# Patient Record
Sex: FEMALE | Race: ASIAN | URBAN - METROPOLITAN AREA
[De-identification: names, ages, dates, MRNs, and addresses within clinical notes are randomized per-mention and may not be internally consistent; named-entity substitution may affect disease eponyms.]

---

## 2024-03-23 ENCOUNTER — OFFICE VISIT (OUTPATIENT)
Age: 54
End: 2024-03-23

## 2024-03-23 VITALS
TEMPERATURE: 97.7 F | WEIGHT: 192 LBS | DIASTOLIC BLOOD PRESSURE: 85 MMHG | HEIGHT: 63 IN | HEART RATE: 86 BPM | SYSTOLIC BLOOD PRESSURE: 127 MMHG | BODY MASS INDEX: 34.02 KG/M2 | OXYGEN SATURATION: 95 %

## 2024-03-23 DIAGNOSIS — L25.9 CONTACT DERMATITIS, UNSPECIFIED CONTACT DERMATITIS TYPE, UNSPECIFIED TRIGGER: Primary | ICD-10-CM

## 2024-03-23 RX ORDER — METHYLPREDNISOLONE 4 MG/1
TABLET ORAL
Qty: 21 TABLET | Refills: 0 | Status: SHIPPED | OUTPATIENT
Start: 2024-03-23 | End: 2024-03-29

## 2024-03-23 RX ORDER — LOSARTAN POTASSIUM 50 MG/1
50 TABLET ORAL DAILY
COMMUNITY

## 2024-03-23 RX ORDER — UBIDECARENONE 75 MG
100 CAPSULE ORAL DAILY
Qty: 30 TABLET | Refills: 0 | Status: SHIPPED | OUTPATIENT
Start: 2024-03-23 | End: 2025-03-23

## 2024-03-23 NOTE — PROGRESS NOTES
Ruba Davidson (:  1970) is a 53 y.o. female,New patient, here for evaluation of the following chief complaint(s):  Rash (Pt has rash on left side of neck to back of neck and in right arm, also recently dyed hair; also in ears. /Pt also has stomach pain and diarrhea, starting today.)      ASSESSMENT/PLAN:  1. Contact dermatitis, unspecified contact dermatitis type, unspecified trigger  - methylPREDNISolone (MEDROL DOSEPACK) 4 MG tablet; Take by mouth.  Dispense: 21 tablet; Refill: 0  - vitamin B-12 (CYANOCOBALAMIN) 100 MCG tablet; Take 1 tablet by mouth daily  Dispense: 30 tablet; Refill: 0       Return if symptoms worsen or fail to improve.    SUBJECTIVE/OBJECTIVE:  Present to clinic with rash on neck start yesterday. Rash itchy on neck happened after used hair dye.      History provided by:  Patient  Rash        Vitals:    24 1202   BP: 127/85   Pulse: 86   Temp: 97.7 °F (36.5 °C)   TempSrc: Oral   SpO2: 95%   Weight: 87.1 kg (192 lb)   Height: 1.6 m (5' 3\")       Review of Systems   Skin:  Positive for rash.       Physical Exam  Constitutional:       Appearance: Normal appearance.   HENT:      Head: Normocephalic and atraumatic.      Nose: Nose normal.      Mouth/Throat:      Mouth: Mucous membranes are moist.   Eyes:      Pupils: Pupils are equal, round, and reactive to light.   Pulmonary:      Effort: Pulmonary effort is normal.      Breath sounds: Normal breath sounds.   Musculoskeletal:         General: Normal range of motion.      Cervical back: Normal range of motion and neck supple.   Skin:     Findings: Rash present.   Neurological:      Mental Status: She is alert.   Psychiatric:         Mood and Affect: Mood normal.           An electronic signature was used to authenticate this note.    --Alen Phan DO

## 2024-05-04 ENCOUNTER — OFFICE VISIT (OUTPATIENT)
Age: 54
End: 2024-05-04

## 2024-05-04 VITALS
DIASTOLIC BLOOD PRESSURE: 87 MMHG | WEIGHT: 206.8 LBS | SYSTOLIC BLOOD PRESSURE: 130 MMHG | TEMPERATURE: 98.1 F | HEART RATE: 90 BPM | OXYGEN SATURATION: 97 % | BODY MASS INDEX: 36.64 KG/M2 | HEIGHT: 63 IN

## 2024-05-04 DIAGNOSIS — I10 PRIMARY HYPERTENSION: ICD-10-CM

## 2024-05-04 DIAGNOSIS — H65.01 RIGHT ACUTE SEROUS OTITIS MEDIA, RECURRENCE NOT SPECIFIED: Primary | ICD-10-CM

## 2024-05-04 RX ORDER — LOSARTAN POTASSIUM 50 MG/1
50 TABLET ORAL DAILY
Qty: 30 TABLET | Refills: 0 | Status: SHIPPED | OUTPATIENT
Start: 2024-05-04

## 2024-05-04 RX ORDER — AMOXICILLIN AND CLAVULANATE POTASSIUM 875; 125 MG/1; MG/1
1 TABLET, FILM COATED ORAL 2 TIMES DAILY
Qty: 20 TABLET | Refills: 0 | Status: SHIPPED | OUTPATIENT
Start: 2024-05-04 | End: 2024-05-14

## 2024-05-04 RX ORDER — IBUPROFEN 600 MG/1
600 TABLET ORAL 3 TIMES DAILY PRN
Qty: 30 TABLET | Refills: 0 | Status: SHIPPED | OUTPATIENT
Start: 2024-05-04

## 2024-05-04 NOTE — PROGRESS NOTES
Ruba Davidson (:  1970) is a 54 y.o. female,Established patient, here for evaluation of the following chief complaint(s):  Otalgia (Patient c/o right ear pain x3 days)      ASSESSMENT/PLAN:  1. Right acute serous otitis media, recurrence not specified  - amoxicillin-clavulanate (AUGMENTIN) 875-125 MG per tablet; Take 1 tablet by mouth 2 times daily for 10 days  Dispense: 20 tablet; Refill: 0  - ibuprofen (ADVIL;MOTRIN) 600 MG tablet; Take 1 tablet by mouth 3 times daily as needed for Pain  Dispense: 30 tablet; Refill: 0    2. Primary hypertension  - losartan (COZAAR) 50 MG tablet; Take 1 tablet by mouth daily  Dispense: 30 tablet; Refill: 0       Return if symptoms worsen or fail to improve.    SUBJECTIVE/OBJECTIVE:  PRESENT TO CLINIC WITH RIGHT EAR PAIN FOR THREE DAYS. NO FEVER. NO INJURY OR DISCHARGE.      History provided by:  Patient  Otalgia         Vitals:    24 1316 24 1318   BP: (!) 143/84 130/87   Site: Right Upper Arm    Position: Sitting    Cuff Size: Large Adult    Pulse: 90    Temp: 98.1 °F (36.7 °C)    TempSrc: Oral    SpO2: 97%    Weight: 93.8 kg (206 lb 12.8 oz)    Height: 1.6 m (5' 3\")        Review of Systems   HENT:  Positive for ear pain.        Physical Exam  Constitutional:       Appearance: Normal appearance.   HENT:      Head: Normocephalic and atraumatic.      Right Ear: Ear canal and external ear normal. There is no impacted cerumen.      Left Ear: Tympanic membrane, ear canal and external ear normal. There is no impacted cerumen.      Ears:      Comments: RIGHT TYMPANIC MEMBRANE BULGING.     Nose: Nose normal.      Mouth/Throat:      Mouth: Mucous membranes are moist.   Eyes:      Pupils: Pupils are equal, round, and reactive to light.   Pulmonary:      Effort: Pulmonary effort is normal.      Breath sounds: Normal breath sounds.   Musculoskeletal:         General: Normal range of motion.      Cervical back: Normal range of motion and neck supple.   Neurological:

## 2024-05-27 ENCOUNTER — OFFICE VISIT (OUTPATIENT)
Age: 54
End: 2024-05-27

## 2024-05-27 VITALS
WEIGHT: 207 LBS | DIASTOLIC BLOOD PRESSURE: 85 MMHG | HEIGHT: 63 IN | HEART RATE: 93 BPM | BODY MASS INDEX: 36.68 KG/M2 | SYSTOLIC BLOOD PRESSURE: 122 MMHG | OXYGEN SATURATION: 98 % | TEMPERATURE: 98.2 F

## 2024-05-27 DIAGNOSIS — H65.04 RECURRENT ACUTE SEROUS OTITIS MEDIA OF RIGHT EAR: Primary | ICD-10-CM

## 2024-05-27 DIAGNOSIS — H92.11 OTORRHEA OF RIGHT EAR: ICD-10-CM

## 2024-05-27 DIAGNOSIS — J06.9 URI WITH COUGH AND CONGESTION: ICD-10-CM

## 2024-05-27 RX ORDER — AMOXICILLIN AND CLAVULANATE POTASSIUM 500; 125 MG/1; MG/1
TABLET, FILM COATED ORAL
COMMUNITY
Start: 2024-03-10 | End: 2024-05-27

## 2024-05-27 RX ORDER — AMOXICILLIN AND CLAVULANATE POTASSIUM 875; 125 MG/1; MG/1
1 TABLET, FILM COATED ORAL 2 TIMES DAILY
Qty: 14 TABLET | Refills: 0 | Status: SHIPPED | OUTPATIENT
Start: 2024-05-27 | End: 2024-06-03

## 2024-05-27 ASSESSMENT — ENCOUNTER SYMPTOMS
SHORTNESS OF BREATH: 0
COUGH: 1
SINUS PRESSURE: 1
SWOLLEN GLANDS: 0
HOARSE VOICE: 1
SINUS COMPLAINT: 1
SORE THROAT: 1

## 2024-05-27 NOTE — PROGRESS NOTES
Ruba Davidson (:  1970) is a 54 y.o. female,Established patient, here for evaluation of the following chief complaint(s):  Sinus Problem (Patient c/o sore throat, cough, body aches/chills and right ear pain x2 days. Patient took Amoxicillin she received from her son this AM and tylenol at 4pm./Hx of Sinus)      ASSESSMENT/PLAN:  Visit Diagnoses and Associated Orders       Recurrent acute serous otitis media of right ear    -  Primary    amoxicillin-clavulanate (AUGMENTIN) 875-125 MG per tablet [[id number]]           URI with cough and congestion        Pseudoephedrine-DM-GG 60- MG TABS [[id number]]           Otorrhea of right ear                    Right ear pain with otorrhea and history of chronic infections with perforation will supply with course of antibiotic to use if not improving.  Seems you also have upper respiratory infection starting yesterday.  Prescription for medication to help with congestion and cough.  Use as needed and prescribed.  Return if symptoms persist or change.      SUBJECTIVE/OBJECTIVE:      History provided by:  Patient   used: No    Sinus Problem  This is a new problem. The current episode started yesterday. The problem has been rapidly worsening since onset. Maximum temperature: unmeasured. The fever has been present for Less than 1 day. The pain is mild. Associated symptoms include chills, congestion, coughing, ear pain, headaches, a hoarse voice, sinus pressure and a sore throat. Pertinent negatives include no shortness of breath or swollen glands. Past treatments include acetaminophen and antibiotics. The treatment provided mild relief.   Otalgia   There is pain in the right ear. This is a chronic problem. The current episode started in the past 7 days. The problem has been gradually worsening. Associated symptoms include coughing, ear discharge, headaches and a sore throat. Her past medical history is significant for a chronic ear infection. There is no

## 2024-05-27 NOTE — PATIENT INSTRUCTIONS
Right ear pain with otorrhea and history of chronic infections with perforation will supply with course of antibiotic to use if not improving.  Seems you also have upper respiratory infection starting yesterday.  Prescription for medication to help with congestion and cough.  Use as needed and prescribed.  Return if symptoms persist or change.

## 2024-05-28 ENCOUNTER — OFFICE VISIT (OUTPATIENT)
Age: 54
End: 2024-05-28

## 2024-05-28 VITALS
SYSTOLIC BLOOD PRESSURE: 120 MMHG | WEIGHT: 207 LBS | OXYGEN SATURATION: 98 % | TEMPERATURE: 98 F | HEIGHT: 63 IN | RESPIRATION RATE: 17 BRPM | HEART RATE: 93 BPM | DIASTOLIC BLOOD PRESSURE: 80 MMHG | BODY MASS INDEX: 36.68 KG/M2

## 2024-05-28 DIAGNOSIS — J45.20 INTERMITTENT ASTHMA WITHOUT COMPLICATION, UNSPECIFIED ASTHMA SEVERITY: Primary | ICD-10-CM

## 2024-05-28 RX ORDER — ALBUTEROL SULFATE 90 UG/1
2 AEROSOL, METERED RESPIRATORY (INHALATION) 4 TIMES DAILY PRN
Qty: 18 G | Refills: 0 | Status: SHIPPED | OUTPATIENT
Start: 2024-05-28

## 2024-05-28 NOTE — PROGRESS NOTES
Ruba Davidson (:  1970) is a 54 y.o. female,Established patient, here for evaluation of the following chief complaint(s):  Congestion (Patient was here yesterday and today she is requesting a inhaler , patient has asthma )      ASSESSMENT/PLAN:  1. Intermittent asthma without complication, unspecified asthma severity  - albuterol sulfate HFA (VENTOLIN HFA) 108 (90 Base) MCG/ACT inhaler; Inhale 2 puffs into the lungs 4 times daily as needed for Wheezing  Dispense: 18 g; Refill: 0       Return if symptoms worsen or fail to improve.    SUBJECTIVE/OBJECTIVE:  PRESENT TO CLINIC WITH CONGESTION.HAS A HISTORY OF ASTHMA AND REQUESTING INHALER. WAS IN CLINIC YESTERDAY AND BEEN TREATED WITH ANTIBIOTICS.      History provided by:  Patient      Vitals:    24 1944   BP: 120/80   Site: Right Upper Arm   Position: Sitting   Cuff Size: Large Adult   Pulse: 93   Resp: 17   Temp: 98 °F (36.7 °C)   SpO2: 98%   Weight: 93.9 kg (207 lb)   Height: 1.6 m (5' 3\")       Review of Systems   HENT:  Positive for congestion.        Physical Exam  Constitutional:       Appearance: Normal appearance.   HENT:      Head: Normocephalic and atraumatic.      Nose: Nose normal.      Mouth/Throat:      Mouth: Mucous membranes are moist.   Eyes:      Pupils: Pupils are equal, round, and reactive to light.   Pulmonary:      Effort: Pulmonary effort is normal.      Breath sounds: Normal breath sounds.   Musculoskeletal:         General: Normal range of motion.      Cervical back: Normal range of motion and neck supple.   Neurological:      Mental Status: She is alert and oriented to person, place, and time.   Psychiatric:         Mood and Affect: Mood normal.           An electronic signature was used to authenticate this note.    --Alen Phan DO

## 2024-08-18 ENCOUNTER — OFFICE VISIT (OUTPATIENT)
Age: 54
End: 2024-08-18

## 2024-08-18 VITALS
BODY MASS INDEX: 35.61 KG/M2 | WEIGHT: 201 LBS | HEART RATE: 83 BPM | DIASTOLIC BLOOD PRESSURE: 85 MMHG | SYSTOLIC BLOOD PRESSURE: 131 MMHG | TEMPERATURE: 98.1 F | HEIGHT: 63 IN | OXYGEN SATURATION: 98 %

## 2024-08-18 DIAGNOSIS — J02.9 SORE THROAT: Primary | ICD-10-CM

## 2024-08-18 DIAGNOSIS — J02.9 PHARYNGITIS, UNSPECIFIED ETIOLOGY: ICD-10-CM

## 2024-08-18 DIAGNOSIS — J32.9 SINUSITIS, UNSPECIFIED CHRONICITY, UNSPECIFIED LOCATION: ICD-10-CM

## 2024-08-18 LAB — S PYO AG THROAT QL: NORMAL

## 2024-08-18 RX ORDER — METHYLPREDNISOLONE 4 MG/1
TABLET ORAL
Qty: 21 TABLET | Refills: 0 | Status: SHIPPED | OUTPATIENT
Start: 2024-08-18 | End: 2024-08-24

## 2024-10-07 ENCOUNTER — OFFICE VISIT (OUTPATIENT)
Age: 54
End: 2024-10-07

## 2024-10-07 VITALS
DIASTOLIC BLOOD PRESSURE: 81 MMHG | HEIGHT: 63 IN | BODY MASS INDEX: 36.14 KG/M2 | HEART RATE: 97 BPM | WEIGHT: 204 LBS | OXYGEN SATURATION: 98 % | TEMPERATURE: 98.5 F | SYSTOLIC BLOOD PRESSURE: 131 MMHG

## 2024-10-07 DIAGNOSIS — S61.412A LACERATION OF LEFT HAND WITHOUT FOREIGN BODY, INITIAL ENCOUNTER: ICD-10-CM

## 2024-10-07 DIAGNOSIS — G89.29 CHRONIC PAIN OF RIGHT KNEE: ICD-10-CM

## 2024-10-07 DIAGNOSIS — H72.91 TYMPANIC MEMBRANE RUPTURE, RIGHT: Primary | ICD-10-CM

## 2024-10-07 DIAGNOSIS — J30.2 SEASONAL ALLERGIES: ICD-10-CM

## 2024-10-07 DIAGNOSIS — M25.561 CHRONIC PAIN OF RIGHT KNEE: ICD-10-CM

## 2024-10-07 RX ORDER — FLUTICASONE PROPIONATE 50 MCG
2 SPRAY, SUSPENSION (ML) NASAL DAILY
Qty: 16 G | Refills: 0 | Status: SHIPPED | OUTPATIENT
Start: 2024-10-07

## 2024-10-07 RX ORDER — IBUPROFEN 600 MG/1
600 TABLET, FILM COATED ORAL 3 TIMES DAILY PRN
Qty: 90 TABLET | Refills: 0 | Status: SHIPPED | OUTPATIENT
Start: 2024-10-07

## 2024-10-07 RX ORDER — MUPIROCIN 20 MG/G
OINTMENT TOPICAL
Qty: 15 G | Refills: 0 | Status: SHIPPED | OUTPATIENT
Start: 2024-10-07 | End: 2024-10-14

## 2024-10-07 ASSESSMENT — ENCOUNTER SYMPTOMS
SHORTNESS OF BREATH: 0
COUGH: 0
RHINORRHEA: 0
SORE THROAT: 0
NAUSEA: 0
VOMITING: 0
DIARRHEA: 0

## 2024-10-07 NOTE — PROGRESS NOTES
Ruba Davidson (:  1970) is a 54 y.o. female,Established patient, here for evaluation of the following chief complaint(s):  Other (Small cut on left thumb with scissors, pain )      ASSESSMENT/PLAN:    ICD-10-CM    1. Tympanic membrane rupture, right  H72.91 amoxicillin-clavulanate (AUGMENTIN) 875-125 MG per tablet      2. Laceration of left hand without foreign body, initial encounter  S61.412A mupirocin (BACTROBAN) 2 % ointment      3. Chronic pain of right knee  M25.561 ibuprofen (ADVIL;MOTRIN) 600 MG tablet    G89.29       4. Seasonal allergies  J30.2 fluticasone (FLONASE) 50 MCG/ACT nasal spray        Patient presents to the clinic with multiple complaints. Patient is experiencing a chronic TM rupture. She thinks that she has gotten water in it recently and it has started to bother her again. This will be treated with Augmentin due to her dye allergy. She also has a healing laceration on her left hand that does not appear to be infected, but patient states that she has a \"weird sensation\" in there and would like something to put on it. Mupirocin ordered. She requests ibuprofen for her chronic knee pain and Fluticasone for her seasonal allergies because she is going out of town. These were sent. Encouraged her to return for worsening or persistent symptoms. She is understanding and agreeable to this plan.     Dx Disposition: AOM, AOE, effusion  Education and handout provided on diagnosis and management of symptoms.   AVS reviewed with patient. Follow up as needed in UC or with PCP for new or worsening symptoms.   Return if symptoms worsen or fail to improve.    SUBJECTIVE/OBJECTIVE:  Patient presents to the clinic with complaints of cut to her left thumb. This started 4-5 days ago. Pain is traveling to the tip of her left thumb. She is also complaining of right ear pain that started 4 days ago. She endorses feeling feverish last night. Denies any body aches. She has tried tylenol with some

## 2024-11-11 ENCOUNTER — OFFICE VISIT (OUTPATIENT)
Age: 54
End: 2024-11-11

## 2024-11-11 VITALS
RESPIRATION RATE: 16 BRPM | WEIGHT: 204 LBS | OXYGEN SATURATION: 96 % | BODY MASS INDEX: 36.14 KG/M2 | SYSTOLIC BLOOD PRESSURE: 114 MMHG | HEIGHT: 63 IN | DIASTOLIC BLOOD PRESSURE: 84 MMHG | HEART RATE: 92 BPM | TEMPERATURE: 98.3 F

## 2024-11-11 DIAGNOSIS — J45.21 MILD INTERMITTENT ASTHMA WITH EXACERBATION: Primary | ICD-10-CM

## 2024-11-11 DIAGNOSIS — H66.93 BILATERAL OTITIS MEDIA, UNSPECIFIED OTITIS MEDIA TYPE: ICD-10-CM

## 2024-11-11 DIAGNOSIS — J30.2 SEASONAL ALLERGIES: ICD-10-CM

## 2024-11-11 DIAGNOSIS — R21 RASH OF BOTH FEET: ICD-10-CM

## 2024-11-11 DIAGNOSIS — I10 PRIMARY HYPERTENSION: ICD-10-CM

## 2024-11-11 DIAGNOSIS — R05.1 ACUTE COUGH: ICD-10-CM

## 2024-11-11 RX ORDER — ALBUTEROL SULFATE 90 UG/1
2 INHALANT RESPIRATORY (INHALATION) EVERY 6 HOURS PRN
Qty: 54 G | Refills: 1 | Status: SHIPPED | OUTPATIENT
Start: 2024-11-11

## 2024-11-11 RX ORDER — LOSARTAN POTASSIUM 50 MG/1
50 TABLET ORAL DAILY
Qty: 90 TABLET | Refills: 1 | Status: SHIPPED | OUTPATIENT
Start: 2024-11-11

## 2024-11-11 RX ORDER — OFLOXACIN 3 MG/ML
10 SOLUTION AURICULAR (OTIC) DAILY
Qty: 10 ML | Refills: 0 | Status: SHIPPED | OUTPATIENT
Start: 2024-11-11 | End: 2024-11-16

## 2024-11-11 RX ORDER — FLUTICASONE PROPIONATE 50 MCG
2 SPRAY, SUSPENSION (ML) NASAL DAILY
Qty: 16 G | Refills: 0 | Status: SHIPPED | OUTPATIENT
Start: 2024-11-11

## 2024-11-11 RX ORDER — HYDROCORTISONE 25 MG/G
CREAM TOPICAL
Qty: 20 G | Refills: 1 | Status: SHIPPED | OUTPATIENT
Start: 2024-11-11

## 2024-11-11 RX ORDER — BENZONATATE 200 MG/1
200 CAPSULE ORAL 3 TIMES DAILY PRN
Qty: 30 CAPSULE | Refills: 0 | Status: SHIPPED | OUTPATIENT
Start: 2024-11-11 | End: 2024-11-21

## 2024-11-11 RX ORDER — OFLOXACIN 3 MG/ML
10 SOLUTION AURICULAR (OTIC) 2 TIMES DAILY
Qty: 10 ML | Refills: 0 | Status: SHIPPED | OUTPATIENT
Start: 2024-11-11 | End: 2024-11-11

## 2024-11-11 RX ORDER — PREDNISONE 20 MG/1
20 TABLET ORAL DAILY
Qty: 5 TABLET | Refills: 0 | Status: SHIPPED | OUTPATIENT
Start: 2024-11-11 | End: 2024-11-16

## 2024-11-11 ASSESSMENT — ENCOUNTER SYMPTOMS
SHORTNESS OF BREATH: 0
RHINORRHEA: 1
COUGH: 1
WHEEZING: 0

## 2024-11-11 NOTE — PATIENT INSTRUCTIONS
Take antibiotics until completed even if feeling better to prevent re-infection.   Use ear drops to help promote healing for the perforation (hole in ear drum)  Take prednisone daily in AM for 5 days which will help with inflammation.  Take cough medication as needed.  You should use your albuterol inhaler every 4-6 hours as needed for cough, shortness of breath, wheezing to help open your lungs.  Increase fluids to help thin mucus, especially electrolyte-infused fluids to help rehydrate your body as respiratory infections can dehydrate you.   Can alternate tylenol and ibuprofen for fever/pain.   Can use nasal saline and/or flonase to help with nasal congestion.    You can use antihistamines like claritin (loratadine), zyrtec (cetirizine), allegra (fexofenadine) daily to help with any allergy symptoms like runny nose, stuffy nose, sneezing   Can use hydrocortisone cream twice a day to feet as needed.

## 2024-11-11 NOTE — PROGRESS NOTES
place, and time.            Pt wants all prescriptions printed and signed      An electronic signature was used to authenticate this note.    --Laila Mar, CORTES - CNP

## 2025-01-11 ENCOUNTER — OFFICE VISIT (OUTPATIENT)
Age: 55
End: 2025-01-11

## 2025-01-11 VITALS
TEMPERATURE: 98.2 F | SYSTOLIC BLOOD PRESSURE: 127 MMHG | DIASTOLIC BLOOD PRESSURE: 83 MMHG | WEIGHT: 203.93 LBS | OXYGEN SATURATION: 98 % | HEART RATE: 81 BPM | BODY MASS INDEX: 36.12 KG/M2

## 2025-01-11 DIAGNOSIS — H66.91 RIGHT OTITIS MEDIA, UNSPECIFIED OTITIS MEDIA TYPE: Primary | ICD-10-CM

## 2025-01-11 DIAGNOSIS — G89.29 OTHER CHRONIC PAIN: ICD-10-CM

## 2025-01-11 DIAGNOSIS — I10 PRIMARY HYPERTENSION: ICD-10-CM

## 2025-01-11 DIAGNOSIS — Z76.0 MEDICATION REFILL: ICD-10-CM

## 2025-01-11 DIAGNOSIS — R05.1 ACUTE COUGH: ICD-10-CM

## 2025-01-11 RX ORDER — PREDNISONE 20 MG/1
20 TABLET ORAL DAILY
Qty: 5 TABLET | Refills: 0 | Status: SHIPPED | OUTPATIENT
Start: 2025-01-11 | End: 2025-01-16

## 2025-01-11 RX ORDER — ALBUTEROL SULFATE 90 UG/1
2 INHALANT RESPIRATORY (INHALATION) EVERY 6 HOURS PRN
Qty: 54 G | Refills: 2 | Status: SHIPPED | OUTPATIENT
Start: 2025-01-11

## 2025-01-11 RX ORDER — IBUPROFEN 800 MG/1
800 TABLET, FILM COATED ORAL EVERY 8 HOURS PRN
Qty: 180 TABLET | Refills: 1 | Status: SHIPPED | OUTPATIENT
Start: 2025-01-11

## 2025-01-11 RX ORDER — BENZONATATE 200 MG/1
200 CAPSULE ORAL 3 TIMES DAILY PRN
Qty: 30 CAPSULE | Refills: 0 | Status: SHIPPED | OUTPATIENT
Start: 2025-01-11 | End: 2025-01-21

## 2025-01-11 RX ORDER — LOSARTAN POTASSIUM 50 MG/1
50 TABLET ORAL DAILY
Qty: 30 TABLET | Refills: 1 | Status: SHIPPED | OUTPATIENT
Start: 2025-01-11

## 2025-01-11 ASSESSMENT — ENCOUNTER SYMPTOMS
RHINORRHEA: 0
SHORTNESS OF BREATH: 0
SORE THROAT: 0
CHEST TIGHTNESS: 0
COUGH: 1
TROUBLE SWALLOWING: 0
WHEEZING: 0

## 2025-02-08 ENCOUNTER — OFFICE VISIT (OUTPATIENT)
Age: 55
End: 2025-02-08

## 2025-02-08 VITALS
BODY MASS INDEX: 36.13 KG/M2 | SYSTOLIC BLOOD PRESSURE: 131 MMHG | OXYGEN SATURATION: 96 % | TEMPERATURE: 99.3 F | HEART RATE: 96 BPM | DIASTOLIC BLOOD PRESSURE: 86 MMHG | HEIGHT: 63 IN | WEIGHT: 203.93 LBS

## 2025-02-08 DIAGNOSIS — J01.00 ACUTE MAXILLARY SINUSITIS, RECURRENCE NOT SPECIFIED: Primary | ICD-10-CM

## 2025-02-08 RX ORDER — FLUTICASONE PROPIONATE 50 MCG
1 SPRAY, SUSPENSION (ML) NASAL DAILY
Qty: 16 G | Refills: 0 | Status: SHIPPED | OUTPATIENT
Start: 2025-02-08

## 2025-02-08 NOTE — PROGRESS NOTES
Ruba Davidson (:  1970) is a 54 y.o. female,Established patient, here for evaluation of the following chief complaint(s):  Ear Fullness and Sinusitis (Pt states ear fullness and right nasal drip, and dry cough for one week so far. )      ASSESSMENT/PLAN:  1. Acute maxillary sinusitis, recurrence not specified  - amoxicillin-clavulanate (AUGMENTIN) 875-125 MG per tablet; Take 1 tablet by mouth 2 times daily for 7 days  Dispense: 14 tablet; Refill: 0  - fluticasone (FLONASE) 50 MCG/ACT nasal spray; 1 spray by Each Nostril route daily  Dispense: 16 g; Refill: 0       Return if symptoms worsen or fail to improve.    SUBJECTIVE/OBJECTIVE:  HPI    Vitals:    25 1606   BP: 131/86   Site: Left Upper Arm   Position: Sitting   Cuff Size: Medium Adult   Pulse: 96   Temp: 99.3 °F (37.4 °C)   TempSrc: Temporal   SpO2: 96%   Weight: 92.5 kg (203 lb 14.8 oz)   Height: 1.6 m (5' 3\")       Review of Systems    Physical Exam      An electronic signature was used to authenticate this note.    --Alen Phan DO

## 2025-03-13 ENCOUNTER — OFFICE VISIT (OUTPATIENT)
Age: 55
End: 2025-03-13

## 2025-03-13 VITALS
SYSTOLIC BLOOD PRESSURE: 128 MMHG | WEIGHT: 190 LBS | HEIGHT: 63 IN | HEART RATE: 84 BPM | RESPIRATION RATE: 20 BRPM | BODY MASS INDEX: 33.66 KG/M2 | DIASTOLIC BLOOD PRESSURE: 85 MMHG | TEMPERATURE: 97.8 F | OXYGEN SATURATION: 96 %

## 2025-03-13 DIAGNOSIS — R05.1 ACUTE COUGH: ICD-10-CM

## 2025-03-13 DIAGNOSIS — I10 PRIMARY HYPERTENSION: ICD-10-CM

## 2025-03-13 DIAGNOSIS — H66.93 ACUTE BACTERIAL INFECTION OF BOTH MIDDLE EARS: Primary | ICD-10-CM

## 2025-03-13 RX ORDER — LOSARTAN POTASSIUM 50 MG/1
50 TABLET ORAL DAILY
Qty: 30 TABLET | Refills: 0 | Status: SHIPPED | OUTPATIENT
Start: 2025-03-13

## 2025-03-13 RX ORDER — FLUTICASONE PROPIONATE 50 MCG
2 SPRAY, SUSPENSION (ML) NASAL DAILY PRN
COMMUNITY
Start: 2025-03-13 | End: 2026-03-13

## 2025-03-13 RX ORDER — PREDNISONE 20 MG/1
40 TABLET ORAL
Qty: 10 TABLET | Refills: 0 | Status: SHIPPED | OUTPATIENT
Start: 2025-03-13 | End: 2025-03-13

## 2025-03-13 RX ORDER — PREDNISONE 20 MG/1
40 TABLET ORAL
Qty: 10 TABLET | Refills: 0 | Status: SHIPPED | OUTPATIENT
Start: 2025-03-13 | End: 2025-03-18

## 2025-03-13 RX ORDER — LOSARTAN POTASSIUM 50 MG/1
50 TABLET ORAL DAILY
Qty: 90 TABLET | Refills: 1 | Status: SHIPPED | OUTPATIENT
Start: 2025-03-13 | End: 2025-03-13

## 2025-03-13 RX ORDER — ALBUTEROL SULFATE 90 UG/1
2 INHALANT RESPIRATORY (INHALATION) EVERY 4 HOURS PRN
Qty: 6.7 G | Refills: 0 | Status: SHIPPED | OUTPATIENT
Start: 2025-03-13 | End: 2026-03-13

## 2025-03-13 RX ORDER — ALBUTEROL SULFATE 90 UG/1
2 INHALANT RESPIRATORY (INHALATION) EVERY 4 HOURS PRN
Qty: 6.7 G | Refills: 0 | Status: SHIPPED | OUTPATIENT
Start: 2025-03-13 | End: 2025-03-13

## 2025-03-13 ASSESSMENT — ENCOUNTER SYMPTOMS
SORE THROAT: 0
VOMITING: 0
ABDOMINAL PAIN: 0
RHINORRHEA: 0
COUGH: 1
DIARRHEA: 0

## 2025-03-13 NOTE — PATIENT INSTRUCTIONS
No point of care tests were completed  Take the antibiotics until completed, do not stop unless otherwise directed by a healthcare provider. I recommend daily yogurt or other probiotics while on antibiotics.  High Blood Pressure: For congestion and runny nose, I recommend Coricidin, Flonase (or other steroid nasal sprays), Zyrtec (or other antihistamines), Vicks vapor rub, and saline nasal spray. Do not use any products with Pseudoephedrine or Phenylephrine in them since you have high blood pressure.  For cough, I recommend Dextromethorphan (Robitussin, Delsym) and Jabier's Vapor Rub. Do not take other cough medications containing Dextromethorphan (DM) while on this medication. You can also use cough drops, honey, and throat lozenges. I recommend 1-2 teaspoons of honey every hour for relief of throat irritation and coughing fits.  For sore throat, you can use throat sprays (Chloraseptic, Cepacol), sipping on warm beverages (tea with honey), ice cream, popsicles, sore throat lozenges, and warm salt water gargles.  For fever, aches/pains, you can take ibuprofen (Advil, Motrin) and acetaminophen (Tylenol), if needed. Do not take this if you have been told to avoid these medications.  For ear pain, I recommend warm compresses over the symptomatic ear(s) for 10-15 minutes, or a hot shower, followed by 1-2 minutes of massaging the area behind your ears and down the jaw-line to help with the ear congestion/ear pressure  Increase your fluid intake and get lots of rest.  Warm teas, humidifiers, nasal lavages, and sleeping in an inclined position are also helpful options that can lessen symptoms.  Follow up with your PCP within 5 days or, if unable, you can return to the clinic if symptoms persist beyond 5 days or if symptoms worsen  If you develop shortness of breath, increased work of breathing, lightheadedness, or chest pain, contact 911, or follow up immediately with the nearest Emergency Department for further evaluation

## 2025-03-13 NOTE — PROGRESS NOTES
Ruba Davidson (: 1970) is a 54 y.o. female, here for evaluation of the following chief complaint(s): Ear Pain (Bilateral ear pain x 4 day)    Ruba Davidson is a: Established patient.   Last Urgent Care Visit: 2025   I have reviewed the patient's medications and basic medical history; see Medication Reconciliation.    ASSESSMENT/PLAN:    ICD-10-CM    1. Acute bacterial infection of both middle ears  H66.93       2. Primary hypertension  I10 losartan (COZAAR) 50 MG tablet     Amb Referral to Primary Care     DISCONTINUED: losartan (COZAAR) 50 MG tablet     DISCONTINUED: losartan (COZAAR) 50 MG tablet      3. Acute cough  R05.1           No point of care tests were completed  Symptoms include ear pain, wheezing, sinus pressure/pain, with exam findings of congestion, pharyngeal erythema, post nasal drip, middle ear effusion(s), perforated TM, suppurative middle ear effusion with bulging TM, there is concern  Bilateral Acute Otitis Media and viral URI with wheezing  Low concern for respiratory distress, community acquired pneumonia, PE mastoiditis, cellulitis, abscess, facial cellulitis, retained foreign bodies within the ear canal, otic eczema, and ear trauma,  Recommended:  Mucinex DM for cough with expectorant relief or plain DM if needed, advised to not use this during the night or with other DM products  Acetaminophen (Tylenol) and/or ibuprofen (Motrin, Advil) for aches/pains as needed, provided there are no contraindications  Dx of HTN: OTC Coricidin, Flonase, Zyrtec, and Saline nasal spray for congestion relief  Prescribed:   Prednisone for airway inflammation  Albuterol for bronchospasms and wheezing  Augmentin for otitis media  Recommended several other OTC and home remedy treatments for symptomatic relief as well  Strict ED follow up instructions provided  Discussed PCP follow up for persisting or worsening symptoms, or to return to the clinic if unable to obtain PCP follow up for worsening

## 2025-04-22 ENCOUNTER — OFFICE VISIT (OUTPATIENT)
Age: 55
End: 2025-04-22

## 2025-04-22 VITALS
HEART RATE: 72 BPM | DIASTOLIC BLOOD PRESSURE: 87 MMHG | BODY MASS INDEX: 33.66 KG/M2 | SYSTOLIC BLOOD PRESSURE: 122 MMHG | OXYGEN SATURATION: 97 % | WEIGHT: 190 LBS

## 2025-04-22 DIAGNOSIS — I10 HYPERTENSION, UNSPECIFIED TYPE: ICD-10-CM

## 2025-04-22 DIAGNOSIS — J01.00 ACUTE MAXILLARY SINUSITIS, RECURRENCE NOT SPECIFIED: Primary | ICD-10-CM

## 2025-04-22 DIAGNOSIS — R06.2 WHEEZING: ICD-10-CM

## 2025-04-22 RX ORDER — IBUPROFEN 600 MG/1
600 TABLET, FILM COATED ORAL 3 TIMES DAILY PRN
Qty: 30 TABLET | Refills: 0 | Status: SHIPPED | OUTPATIENT
Start: 2025-04-22 | End: 2025-05-02

## 2025-04-22 RX ORDER — METHYLPREDNISOLONE 4 MG/1
TABLET ORAL
Qty: 21 TABLET | Refills: 0 | Status: SHIPPED | OUTPATIENT
Start: 2025-04-22 | End: 2025-04-28

## 2025-04-22 RX ORDER — LOSARTAN POTASSIUM 50 MG/1
50 TABLET ORAL DAILY
Qty: 30 TABLET | Refills: 0 | Status: SHIPPED | OUTPATIENT
Start: 2025-04-22 | End: 2025-05-22

## 2025-04-22 RX ORDER — IBUPROFEN 600 MG/1
600 TABLET, FILM COATED ORAL 3 TIMES DAILY PRN
Qty: 30 TABLET | Refills: 0 | Status: SHIPPED | OUTPATIENT
Start: 2025-04-22

## 2025-04-22 NOTE — PROGRESS NOTES
Ruba Davidson (:  1970) is a 55 y.o. female,Established patient, here for evaluation of the following chief complaint(s):  Ear Pain (Pt states right ear pain just travelling back and forth from [place]. )      ASSESSMENT/PLAN:  1. Acute maxillary sinusitis, recurrence not specified  - amoxicillin-clavulanate (AUGMENTIN) 875-125 MG per tablet; Take 1 tablet by mouth 2 times daily for 10 days  Dispense: 20 tablet; Refill: 0    2. Hypertension, unspecified type  - losartan (COZAAR) 50 MG tablet; Take 1 tablet by mouth daily  Dispense: 30 tablet; Refill: 0       Return if symptoms worsen or fail to improve.    SUBJECTIVE/OBJECTIVE:  PRESENT TO CLINIC WITH RIGHT EAR PAIN. WORRY AS BEEN TRAVELLING BACK AND FORTH.NO EAR INJURY OR DISCHARGE      History provided by:  Patient  Ear Pain         Vitals:    25 1340   BP: 122/87   BP Site: Left Upper Arm   Patient Position: Sitting   BP Cuff Size: Medium Adult   Pulse: 72   SpO2: 97%   Weight: 86.2 kg (190 lb)       Review of Systems   HENT:  Positive for congestion and ear pain.        Physical Exam  Constitutional:       Appearance: Normal appearance.   HENT:      Head: Normocephalic and atraumatic.      Nose: Congestion present.      Mouth/Throat:      Mouth: Mucous membranes are moist.   Eyes:      Pupils: Pupils are equal, round, and reactive to light.   Pulmonary:      Effort: Pulmonary effort is normal.      Breath sounds: Normal breath sounds.   Musculoskeletal:         General: Normal range of motion.      Cervical back: Normal range of motion and neck supple.   Skin:     General: Skin is warm.   Neurological:      Mental Status: She is alert and oriented to person, place, and time.   Psychiatric:         Mood and Affect: Mood normal.         Behavior: Behavior normal.           An electronic signature was used to authenticate this note.    --Alen Phan DO

## 2025-06-16 ENCOUNTER — OFFICE VISIT (OUTPATIENT)
Age: 55
End: 2025-06-16

## 2025-06-16 VITALS
BODY MASS INDEX: 34.02 KG/M2 | TEMPERATURE: 98.3 F | HEART RATE: 95 BPM | SYSTOLIC BLOOD PRESSURE: 110 MMHG | OXYGEN SATURATION: 95 % | HEIGHT: 63 IN | DIASTOLIC BLOOD PRESSURE: 77 MMHG | WEIGHT: 192 LBS

## 2025-06-16 DIAGNOSIS — R06.2 WHEEZING: ICD-10-CM

## 2025-06-16 DIAGNOSIS — H65.191 ACUTE NON-SUPPURATIVE OTITIS MEDIA, RIGHT: ICD-10-CM

## 2025-06-16 DIAGNOSIS — J40 BRONCHITIS: Primary | ICD-10-CM

## 2025-06-16 RX ORDER — LOSARTAN POTASSIUM 50 MG/1
50 TABLET ORAL DAILY
Qty: 30 TABLET | Refills: 0 | Status: SHIPPED | OUTPATIENT
Start: 2025-06-16

## 2025-06-16 RX ORDER — ALBUTEROL SULFATE 0.83 MG/ML
2.5 SOLUTION RESPIRATORY (INHALATION) ONCE
Status: COMPLETED | OUTPATIENT
Start: 2025-06-16 | End: 2025-06-16

## 2025-06-16 RX ADMIN — ALBUTEROL SULFATE 2.5 MG: 0.83 SOLUTION RESPIRATORY (INHALATION) at 14:45

## 2025-06-16 ASSESSMENT — ENCOUNTER SYMPTOMS
EYES NEGATIVE: 1
FACIAL SWELLING: 1
SHORTNESS OF BREATH: 1
COUGH: 1

## 2025-06-16 NOTE — PROGRESS NOTES
is nonproductive patient denies any chest pain no shortness of breath.          History reviewed. No pertinent past medical history.    Vitals:    06/16/25 1418 06/16/25 1458   BP: 110/77    BP Site: Right Upper Arm    Patient Position: Sitting    BP Cuff Size: Large Adult    Pulse: (!) 102 95   Temp: 98.3 °F (36.8 °C)    TempSrc: Oral    SpO2: 96% 95%   Weight: 87.1 kg (192 lb)    Height: 1.6 m (5' 3\")        Review of Systems   Constitutional: Negative.    HENT:  Positive for congestion, ear pain and facial swelling.    Eyes: Negative.    Respiratory:  Positive for cough and shortness of breath.    Endocrine: Negative.    Genitourinary: Negative.    All other systems reviewed and are negative.      Physical Exam  Vitals and nursing note reviewed.   Constitutional:       Appearance: Normal appearance.   HENT:      Head: Normocephalic and atraumatic.      Right Ear: Ear canal normal.      Left Ear: Tympanic membrane and ear canal normal.      Ears:      Comments: The right TM grayish in color with fluid noted.  There is a area of perforation from chronic scarring.  The left foot looks fine.  Ear canal is not obstructed there is no earwax noted.  Tenderness on palpation.  Right side of face is slightly swollen.  There is no adenopathy.     Nose: Congestion and rhinorrhea present.      Mouth/Throat:      Pharynx: Oropharynx is clear. No oropharyngeal exudate or posterior oropharyngeal erythema.   Eyes:      Extraocular Movements: Extraocular movements intact.      Pupils: Pupils are equal, round, and reactive to light.   Cardiovascular:      Rate and Rhythm: Regular rhythm. Tachycardia present.      Pulses: Normal pulses.   Pulmonary:      Effort: Pulmonary effort is normal.      Breath sounds: Wheezing and rhonchi present.   Abdominal:      General: Abdomen is flat.   Musculoskeletal:      Cervical back: Normal range of motion.   Neurological:      Mental Status: She is alert.           An electronic signature was

## 2025-06-21 ENCOUNTER — OFFICE VISIT (OUTPATIENT)
Age: 55
End: 2025-06-21

## 2025-06-21 VITALS
DIASTOLIC BLOOD PRESSURE: 82 MMHG | WEIGHT: 192 LBS | TEMPERATURE: 98.1 F | HEIGHT: 63 IN | HEART RATE: 89 BPM | BODY MASS INDEX: 34.02 KG/M2 | SYSTOLIC BLOOD PRESSURE: 127 MMHG | OXYGEN SATURATION: 98 %

## 2025-06-21 DIAGNOSIS — R21 RASH: Primary | ICD-10-CM

## 2025-06-21 DIAGNOSIS — J40 BRONCHITIS: ICD-10-CM

## 2025-06-21 RX ORDER — AZITHROMYCIN 250 MG/1
TABLET, FILM COATED ORAL
Qty: 6 TABLET | Refills: 0 | Status: SHIPPED | OUTPATIENT
Start: 2025-06-21 | End: 2025-06-21 | Stop reason: SDUPTHER

## 2025-06-21 RX ORDER — SULFAMETHOXAZOLE AND TRIMETHOPRIM 800; 160 MG/1; MG/1
1 TABLET ORAL 2 TIMES DAILY
Qty: 14 TABLET | Refills: 0 | Status: SHIPPED | OUTPATIENT
Start: 2025-06-21 | End: 2025-06-28

## 2025-06-21 RX ORDER — IBUPROFEN 600 MG/1
600 TABLET, FILM COATED ORAL 3 TIMES DAILY PRN
Qty: 30 TABLET | Refills: 0 | Status: SHIPPED | OUTPATIENT
Start: 2025-06-21

## 2025-06-21 RX ORDER — AZITHROMYCIN 250 MG/1
TABLET, FILM COATED ORAL
Qty: 6 TABLET | Refills: 0 | Status: SHIPPED | OUTPATIENT
Start: 2025-06-21 | End: 2025-07-01

## 2025-06-21 RX ORDER — METHYLPREDNISOLONE 4 MG/1
TABLET ORAL
Qty: 21 TABLET | Refills: 0 | Status: SHIPPED | OUTPATIENT
Start: 2025-06-21 | End: 2025-06-27

## 2025-06-21 RX ORDER — TRIAMCINOLONE ACETONIDE 1 MG/G
OINTMENT TOPICAL 2 TIMES DAILY
Qty: 20 G | Refills: 0 | Status: SHIPPED | OUTPATIENT
Start: 2025-06-21 | End: 2025-06-28

## 2025-06-21 NOTE — PROGRESS NOTES
Ruba Davidson (:  1970) is a 55 y.o. female,Established patient, here for evaluation of the following chief complaint(s):  Rash (Rash on abdomen x 4 days . Pt was seen on  for illness and noticed rash due to medicare  )      ASSESSMENT/PLAN:  1. Rash  - triamcinolone (KENALOG) 0.1 % ointment; Apply topically 2 times daily for 7 days  Dispense: 20 g; Refill: 0  - methylPREDNISolone (MEDROL DOSEPACK) 4 MG tablet; Take by mouth.  Dispense: 21 tablet; Refill: 0    2. Bronchitis  - methylPREDNISolone (MEDROL DOSEPACK) 4 MG tablet; Take by mouth.  Dispense: 21 tablet; Refill: 0  - ibuprofen (ADVIL;MOTRIN) 600 MG tablet; Take 1 tablet by mouth 3 times daily as needed for Pain  Dispense: 30 tablet; Refill: 0   -azithromycin prescribed    Return if symptoms worsen or fail to improve.    SUBJECTIVE/OBJECTIVE:  Present to clinic with itchy rash on abdomen . Been treated a week ago for bronchitis      History provided by:  Patient  Rash        Vitals:    25 0846   BP: 127/82   BP Site: Left Upper Arm   Patient Position: Sitting   BP Cuff Size: Large Adult   Pulse: 89   Temp: 98.1 °F (36.7 °C)   TempSrc: Oral   SpO2: 98%   Weight: 87.1 kg (192 lb)   Height: 1.6 m (5' 3\")       Review of Systems   Skin:  Positive for rash.       Physical Exam  Constitutional:       Appearance: Normal appearance.   HENT:      Head: Normocephalic and atraumatic.      Nose: Nose normal.      Mouth/Throat:      Mouth: Mucous membranes are moist.   Eyes:      Pupils: Pupils are equal, round, and reactive to light.   Pulmonary:      Effort: Pulmonary effort is normal.      Breath sounds: Normal breath sounds.   Musculoskeletal:         General: Normal range of motion.      Cervical back: Normal range of motion and neck supple.   Skin:     Findings: Rash present.             Comments: abdomen   Neurological:      Mental Status: She is alert and oriented to person, place, and time.   Psychiatric:         Mood and Affect: Mood normal.

## 2025-06-24 DIAGNOSIS — R21 RASH: ICD-10-CM

## 2025-06-24 RX ORDER — TRIAMCINOLONE ACETONIDE 1 MG/G
OINTMENT TOPICAL
Qty: 15 G | OUTPATIENT
Start: 2025-06-24